# Patient Record
Sex: FEMALE | Race: WHITE | NOT HISPANIC OR LATINO | Employment: FULL TIME | URBAN - METROPOLITAN AREA
[De-identification: names, ages, dates, MRNs, and addresses within clinical notes are randomized per-mention and may not be internally consistent; named-entity substitution may affect disease eponyms.]

---

## 2024-05-01 ENCOUNTER — TELEPHONE (OUTPATIENT)
Dept: GASTROENTEROLOGY | Facility: MEDICAL CENTER | Age: 19
End: 2024-05-01

## 2024-05-01 ENCOUNTER — OFFICE VISIT (OUTPATIENT)
Dept: GASTROENTEROLOGY | Facility: MEDICAL CENTER | Age: 19
End: 2024-05-01
Payer: COMMERCIAL

## 2024-05-01 VITALS
TEMPERATURE: 98.8 F | WEIGHT: 218 LBS | DIASTOLIC BLOOD PRESSURE: 79 MMHG | BODY MASS INDEX: 37.42 KG/M2 | OXYGEN SATURATION: 99 % | HEART RATE: 98 BPM | SYSTOLIC BLOOD PRESSURE: 121 MMHG

## 2024-05-01 DIAGNOSIS — R10.9 ABDOMINAL PAIN: ICD-10-CM

## 2024-05-01 DIAGNOSIS — R19.4 CHANGE IN BOWEL HABITS: Primary | ICD-10-CM

## 2024-05-01 PROCEDURE — 99244 OFF/OP CNSLTJ NEW/EST MOD 40: CPT | Performed by: NURSE PRACTITIONER

## 2024-05-01 RX ORDER — CLINDAMYCIN HYDROCHLORIDE 300 MG/1
300 CAPSULE ORAL 2 TIMES DAILY
COMMUNITY
Start: 2024-04-29 | End: 2024-05-06

## 2024-05-01 RX ORDER — POLYETHYLENE GLYCOL 3350, SODIUM SULFATE ANHYDROUS, SODIUM BICARBONATE, SODIUM CHLORIDE, POTASSIUM CHLORIDE 236; 22.74; 6.74; 5.86; 2.97 G/4L; G/4L; G/4L; G/4L; G/4L
4000 POWDER, FOR SOLUTION ORAL ONCE
Qty: 4000 ML | Refills: 0 | Status: SHIPPED | OUTPATIENT
Start: 2024-05-01 | End: 2024-05-01

## 2024-05-01 NOTE — TELEPHONE ENCOUNTER
Procedure: Colonoscopy   Date: 06/12/2024  Physician performing: Dr. Jaiyeola  Location of procedure:  [facility]   Instructions given to patient: Golytely   Diabetic: N/A  Clearances: N/A    Patient will do follow up in the [facility] after procedure

## 2024-05-01 NOTE — PROGRESS NOTES
[facility] - Outpatient Consultation  Kelly Hernández 18 y.o. female MRN: [id number]  Encounter: [id number]          ASSESSMENT AND PLAN:    Kelly Hernández is a 18 y.o. female who presents with complaint of abdominal pain.    1. Abdominal pain  2.  Change in bowel habits    Started approximately 1 year ago with chronic right lower quadrant pain that can radiate to her right flank.  She reports that this pain is a chronic 4-6/10 and is constant.  It can be worsened with eating.  No relief with a BM.  BMs are 2-3 times per week.  She does feel like she completely evacuates.  Denies any melena or hematochezia.  No help with Tylenol or NSAIDs.  No weight loss, fever or chills.  Denies any nausea, vomiting or dysphagia.  CT 5/23 noted mild enlarged lymph nodes in the right lower quadrant which could be consistent with mesenteric adenitis.  She is followed by GYN.  Workup negative for GYN but considering laparoscopy to rule out endometriosis.  GYN recommending GI evaluation before laparoscopy.  No family history of inflammatory bowel disease.  CBC and CMP normal.  We did have a long discussion of possible causes of her right lower quadrant pain including but not limited to constipation, inflammatory bowel disease, endometriosis, IBS.  Due to her CT being abnormal 5/23 will repeat CT.  Will also set up colonoscopy to rule out any inflammatory bowel disease before patient has a laparoscopy.    -CRP, fecal calprotectin, celiac panel  -Start MiraLAX 1 capful daily  -CT abdomen pelvis  -Colonoscopy with GoLytely/Dulcolax prep  -Follow-up in office after testing    I obtained informed consent from the patient. The risks/benefits/alternatives of the procedure were discussed with the patient. Risks included, but not limited to, infection, bleeding, perforation, injury to organs in the abdomen, missed lesion and incomplete procedure were discussed. Patient was agreeable and electronic signature was  obtained.          ______________________________________________________________________    HPI:    eKlly Hernández is a 18 y.o. female who presents with complaint of abdominal pain.  She has a past medical history of chronic headaches.    Started with chronic right lower quadrant pain approximately 1 year ago.  It is a steady 4-6 out of 10.  Reports that is a cramping and sharp pain that can radiate to her right flank.  No relief with Tylenol or NSAIDs.  Can be worse after eating at times.  Not relieved with a BM.  Stools are 2-3 times per week.  Occasionally has to strain.  Denies any melena hematochezia.  No weight loss.  No skin rashes or frequent eye infections.  No family history of any inflammatory bowel disease.  She did have a CT 5/23 which showed mild prominent mesenteric lymph nodes in the right lower abdomen which can be seen with mild mesenteric adenitis.  Appendix was normal.  There was small amount of free fluid in the pelvis.  She also has been followed by her GYN.  Pain can be worsened during her menstrual cycle.  Reports her cycles last several days and she does have heavy flow.  He did have an ultrasound of the pelvis with transvaginal which noted mild endometrial thickening.  Currently she is discussing a possible laparoscopy to rule out endometriosis with her GYN but recommended GI evaluation first.  Recent CBC and CMP are normal.      Ultrasound right upper quadrant 10/23- normal    CT of pelvis 5/23-mild prominent mesenteric lymph nodes in the right lower abdomen which can be seen with mild mesenteric adenitis.  Normal-appearing appendix.  Small amount of free fluid in the pelvis.    Ultrasound of the pelvis with transvaginal 5/23-mild endometrial thickening.  Ovaries are normal.  The uterus is normal in size and contour.    Labs 4/24-CMP normal other than total bili 1.02, CBC normal.      REVIEW OF SYSTEMS:    CONSTITUTIONAL: Denies any fever, chills, rigors, and weight loss.  HEENT: No  earache or tinnitus. Denies hearing loss or visual disturbances.  CARDIOVASCULAR: No chest pain or palpitations.   RESPIRATORY: Denies any cough, hemoptysis, shortness of breath or dyspnea on exertion.  GASTROINTESTINAL: As noted in the History of Present Illness.   GENITOURINARY: No problems with urination. Denies any hematuria or dysuria.  NEUROLOGIC: No dizziness or vertigo, denies headaches.   MUSCULOSKELETAL: Denies any muscle or joint pain.   SKIN: Denies skin rashes or itching.   ENDOCRINE: Denies excessive thirst. Denies intolerance to heat or cold.  PSYCHOSOCIAL: Denies depression or anxiety. Denies any recent memory loss.       Historical Information   Past Medical History:   Diagnosis Date   • Concussion 11/2020     History reviewed. No pertinent surgical history.  Social History   Social History     Substance and Sexual Activity   Alcohol Use Never     Social History     Substance and Sexual Activity   Drug Use Never     Social History     Tobacco Use   Smoking Status Never   Smokeless Tobacco Never     Family History   Adopted: Yes       Meds/Allergies       Current Outpatient Medications:   •  clindamycin (CLEOCIN) 300 MG capsule  •  norethindrone-ethinyl estradiol-iron (MICROGESTIN FE1.5/30) 1.5-30 MG-MCG tablet  •  PARoxetine (PAXIL) 40 MG tablet  •  polyethylene glycol (Golytely) 4000 mL solution    Allergies   Allergen Reactions   • Cephalexin Vomiting     Severe vomiting           Objective     Blood pressure 121/79, pulse 98, temperature 98.8 °F (37.1 °C), temperature source Tympanic, weight 98.9 kg (218 lb), last menstrual period 03/21/2024, SpO2 99%. Body mass index is 37.42 kg/m².        PHYSICAL EXAM:      General Appearance:   Alert, cooperative, no distress   HEENT:   Normocephalic, atraumatic, anicteric.     Neck:  Supple, symmetrical, trachea midline   Lungs:   Clear to auscultation bilaterally; no rales, rhonchi or wheezing; respirations unlabored    Heart::   Regular rate and rhythm; no  murmur, rub, or gallop.   Abdomen:   Soft, tender right lower quadrant with light palpation, no rebound tenderness, non-distended; normal bowel sounds; no masses, no organomegaly    Genitalia:   Deferred    Rectal:   Deferred    Extremities:  No cyanosis, clubbing or edema    Pulses:  2+ and symmetric    Skin:  No jaundice, rashes, or lesions    Lymph nodes:  No palpable cervical lymphadenopathy        Lab Results:   No visits with results within 1 Day(s) from this visit.   Latest known visit with results is:   Admission on 04/06/2024, Discharged on 04/06/2024   Component Date Value   • WBC 04/06/2024 6.98    • RBC 04/06/2024 4.65    • Hemoglobin 04/06/2024 13.4    • Hematocrit 04/06/2024 40.6    • MCV 04/06/2024 87    • MCH 04/06/2024 28.8    • MCHC 04/06/2024 33.0    • RDW 04/06/2024 12.1    • MPV 04/06/2024 9.1    • Platelets 04/06/2024 265    • nRBC 04/06/2024 0    • Segmented % 04/06/2024 51    • Immature Grans % 04/06/2024 0    • Lymphocytes % 04/06/2024 32    • Monocytes % 04/06/2024 13 (H)    • Eosinophils Relative 04/06/2024 3    • Basophils Relative 04/06/2024 1    • Absolute Neutrophils 04/06/2024 3.56    • Absolute Immature Grans 04/06/2024 0.02    • Absolute Lymphocytes 04/06/2024 2.26    • Absolute Monocytes 04/06/2024 0.89    • Eosinophils Absolute 04/06/2024 0.20    • Basophils Absolute 04/06/2024 0.05    • Sodium 04/06/2024 138    • Potassium 04/06/2024 3.5    • Chloride 04/06/2024 104    • CO2 04/06/2024 28    • ANION GAP 04/06/2024 6    • BUN 04/06/2024 11    • Creatinine 04/06/2024 0.66    • Glucose 04/06/2024 77    • Calcium 04/06/2024 9.4    • AST 04/06/2024 18    • ALT 04/06/2024 16    • Alkaline Phosphatase 04/06/2024 68    • Total Protein 04/06/2024 6.6    • Albumin 04/06/2024 4.2    • Total Bilirubin 04/06/2024 1.02 (H)    • eGFR 04/06/2024 128    • LACTIC ACID 04/06/2024 1.2    • Color, UA 04/06/2024 Yellow    • Clarity, UA 04/06/2024 Cloudy    • Specific Gravity, UA 04/06/2024 1.015    •  "pH, UA 04/06/2024 8.0    • Leukocytes, UA 04/06/2024 Small (A)    • Nitrite, UA 04/06/2024 Negative    • Protein, UA 04/06/2024 Negative    • Glucose, UA 04/06/2024 Negative    • Ketones, UA 04/06/2024 Negative    • Urobilinogen, UA 04/06/2024 4.0 (A)    • Bilirubin, UA 04/06/2024 Negative    • Occult Blood, UA 04/06/2024 Negative    • EXT Preg Test, Ur 04/06/2024 Negative    • Control 04/06/2024 Valid    • hs TnI 0hr 04/06/2024 <2    • TSH 3RD GENERATON 04/06/2024 1.082    • RBC, UA 04/06/2024 0-1    • WBC, UA 04/06/2024 1-2    • Epithelial Cells 04/06/2024 Occasional    • Bacteria, UA 04/06/2024 Occasional    • Ventricular Rate 04/06/2024 83    • Atrial Rate 04/06/2024 83    • DC Interval 04/06/2024 136    • QRSD Interval 04/06/2024 86    • QT Interval 04/06/2024 346    • QTC Interval 04/06/2024 406    • P Axis 04/06/2024 51    • QRS Axis 04/06/2024 -14    • T Wave Axis 04/06/2024 15        Lab Results   Component Value Date    WBC 6.98 04/06/2024    HGB 13.4 04/06/2024    HCT 40.6 04/06/2024    MCV 87 04/06/2024     04/06/2024       Lab Results   Component Value Date    SODIUM 138 04/06/2024    K 3.5 04/06/2024     04/06/2024    CO2 28 04/06/2024    AGAP 6 04/06/2024    BUN 11 04/06/2024    CREATININE 0.66 04/06/2024    GLUC 77 04/06/2024    CALCIUM 9.4 04/06/2024    AST 18 04/06/2024    ALT 16 04/06/2024    ALKPHOS 68 04/06/2024    TP 6.6 04/06/2024    TBILI 1.02 (H) 04/06/2024    EGFR 128 04/06/2024       No results found for: \"CRP\"    Lab Results   Component Value Date    WXZ3DMMSAMVJ 1.082 04/06/2024    TSH 0.62 08/17/2022       No results found for: \"IRON\", \"TIBC\", \"FERRITIN\"    Radiology Results:   No results found.  "

## 2024-05-09 ENCOUNTER — HOSPITAL ENCOUNTER (OUTPATIENT)
Dept: CT IMAGING | Facility: HOSPITAL | Age: 19
Discharge: HOME | End: 2024-05-09
Payer: COMMERCIAL

## 2024-05-09 DIAGNOSIS — R10.9 ABDOMINAL PAIN: ICD-10-CM

## 2024-05-09 PROCEDURE — 74177 CT ABD & PELVIS W/CONTRAST: CPT

## 2024-05-09 RX ADMIN — IOHEXOL 100 ML: 350 INJECTION, SOLUTION INTRAVENOUS at 17:10

## 2024-06-11 RX ORDER — SODIUM CHLORIDE, SODIUM LACTATE, POTASSIUM CHLORIDE, CALCIUM CHLORIDE 600; 310; 30; 20 MG/100ML; MG/100ML; MG/100ML; MG/100ML
20 INJECTION, SOLUTION INTRAVENOUS CONTINUOUS
OUTPATIENT
Start: 2024-06-11

## 2024-06-11 RX ORDER — SODIUM CHLORIDE, SODIUM LACTATE, POTASSIUM CHLORIDE, CALCIUM CHLORIDE 600; 310; 30; 20 MG/100ML; MG/100ML; MG/100ML; MG/100ML
125 INJECTION, SOLUTION INTRAVENOUS CONTINUOUS
OUTPATIENT
Start: 2024-06-11

## 2024-06-13 ENCOUNTER — TELEPHONE (OUTPATIENT)
Dept: GASTROENTEROLOGY | Facility: CLINIC | Age: 19
End: 2024-06-13

## 2024-06-13 NOTE — TELEPHONE ENCOUNTER
I called the patient to reschedule her Colonoscopy, she agreed to reschedule. She has all the instructions and the prep.

## 2024-07-16 ENCOUNTER — ANESTHESIA (OUTPATIENT)
Dept: ANESTHESIOLOGY | Facility: HOSPITAL | Age: 19
End: 2024-07-16

## 2024-07-16 ENCOUNTER — ANESTHESIA EVENT (OUTPATIENT)
Dept: ANESTHESIOLOGY | Facility: HOSPITAL | Age: 19
End: 2024-07-16

## 2024-07-16 NOTE — ANESTHESIA PREPROCEDURE EVALUATION
Procedure:  PRE-OP ONLY    Bowel habits    ECG: NSR with septal infarct       Relevant Problems   NEURO/PSYCH   (+) Other headache syndrome             Anesthesia Plan  ASA Score- 2     Anesthesia Type- IV sedation with anesthesia with ASA Monitors.         Additional Monitors:     Airway Plan:            Plan Factors-    Chart reviewed. EKG reviewed.  Existing labs reviewed. Patient summary reviewed.                  Induction-     Postoperative Plan-         Informed Consent-

## 2024-07-17 ENCOUNTER — TELEPHONE (OUTPATIENT)
Age: 19
End: 2024-07-17

## 2024-07-17 NOTE — TELEPHONE ENCOUNTER
Spoke with Pharmacy. Rx on hold as pt previously declined it. They will fill. There is a $3 reprocessing fee. Pt made aware. No further concerns.

## 2024-07-17 NOTE — TELEPHONE ENCOUNTER
Patients GI provider:  Dr. Olivares    Number to return call: [phone]    Reason for call: Pt calling to advise Katiuska napoles at pharmacy. Transferred to [facility] in triage for further assistance.    Scheduled procedure/appointment date if applicable: procedure 07/19/2024

## 2024-07-19 ENCOUNTER — ANESTHESIA (OUTPATIENT)
Dept: PERIOP | Facility: HOSPITAL | Age: 19
End: 2024-07-19

## 2024-07-19 ENCOUNTER — ANESTHESIA EVENT (OUTPATIENT)
Dept: PERIOP | Facility: HOSPITAL | Age: 19
End: 2024-07-19

## 2024-07-19 ENCOUNTER — HOSPITAL ENCOUNTER (OUTPATIENT)
Dept: PERIOP | Facility: HOSPITAL | Age: 19
Setting detail: OUTPATIENT SURGERY
End: 2024-07-19
Payer: COMMERCIAL

## 2024-07-19 VITALS
TEMPERATURE: 97.6 F | DIASTOLIC BLOOD PRESSURE: 82 MMHG | HEIGHT: 64 IN | BODY MASS INDEX: 37.22 KG/M2 | RESPIRATION RATE: 18 BRPM | WEIGHT: 218 LBS | HEART RATE: 70 BPM | OXYGEN SATURATION: 100 % | SYSTOLIC BLOOD PRESSURE: 130 MMHG

## 2024-07-19 DIAGNOSIS — R19.4 CHANGE IN BOWEL HABITS: ICD-10-CM

## 2024-07-19 DIAGNOSIS — R10.9 ABDOMINAL PAIN: ICD-10-CM

## 2024-07-19 LAB
EXT PREGNANCY TEST URINE: NEGATIVE
EXT. CONTROL: NORMAL

## 2024-07-19 PROCEDURE — 45380 COLONOSCOPY AND BIOPSY: CPT | Performed by: STUDENT IN AN ORGANIZED HEALTH CARE EDUCATION/TRAINING PROGRAM

## 2024-07-19 PROCEDURE — 88305 TISSUE EXAM BY PATHOLOGIST: CPT | Performed by: SPECIALIST

## 2024-07-19 PROCEDURE — 81025 URINE PREGNANCY TEST: CPT | Performed by: ANESTHESIOLOGY

## 2024-07-19 RX ORDER — SODIUM CHLORIDE, SODIUM LACTATE, POTASSIUM CHLORIDE, CALCIUM CHLORIDE 600; 310; 30; 20 MG/100ML; MG/100ML; MG/100ML; MG/100ML
100 INJECTION, SOLUTION INTRAVENOUS CONTINUOUS
Status: DISCONTINUED | OUTPATIENT
Start: 2024-07-19 | End: 2024-07-23 | Stop reason: HOSPADM

## 2024-07-19 RX ORDER — LIDOCAINE HYDROCHLORIDE 10 MG/ML
INJECTION, SOLUTION EPIDURAL; INFILTRATION; INTRACAUDAL; PERINEURAL AS NEEDED
Status: DISCONTINUED | OUTPATIENT
Start: 2024-07-19 | End: 2024-07-19

## 2024-07-19 RX ORDER — SODIUM CHLORIDE, SODIUM LACTATE, POTASSIUM CHLORIDE, CALCIUM CHLORIDE 600; 310; 30; 20 MG/100ML; MG/100ML; MG/100ML; MG/100ML
125 INJECTION, SOLUTION INTRAVENOUS CONTINUOUS
Status: DISCONTINUED | OUTPATIENT
Start: 2024-07-19 | End: 2024-07-23 | Stop reason: HOSPADM

## 2024-07-19 RX ORDER — SODIUM CHLORIDE, SODIUM LACTATE, POTASSIUM CHLORIDE, CALCIUM CHLORIDE 600; 310; 30; 20 MG/100ML; MG/100ML; MG/100ML; MG/100ML
20 INJECTION, SOLUTION INTRAVENOUS CONTINUOUS
Status: DISCONTINUED | OUTPATIENT
Start: 2024-07-19 | End: 2024-07-23 | Stop reason: HOSPADM

## 2024-07-19 RX ORDER — SODIUM CHLORIDE, SODIUM LACTATE, POTASSIUM CHLORIDE, CALCIUM CHLORIDE 600; 310; 30; 20 MG/100ML; MG/100ML; MG/100ML; MG/100ML
INJECTION, SOLUTION INTRAVENOUS CONTINUOUS PRN
Status: DISCONTINUED | OUTPATIENT
Start: 2024-07-19 | End: 2024-07-19

## 2024-07-19 RX ORDER — PROPOFOL 10 MG/ML
INJECTION, EMULSION INTRAVENOUS AS NEEDED
Status: DISCONTINUED | OUTPATIENT
Start: 2024-07-19 | End: 2024-07-19

## 2024-07-19 RX ADMIN — PROPOFOL 50 MG: 10 INJECTION, EMULSION INTRAVENOUS at 11:26

## 2024-07-19 RX ADMIN — PROPOFOL 200 MG: 10 INJECTION, EMULSION INTRAVENOUS at 11:17

## 2024-07-19 RX ADMIN — PROPOFOL 20 MG: 10 INJECTION, EMULSION INTRAVENOUS at 11:20

## 2024-07-19 RX ADMIN — PROPOFOL 130 MCG/KG/MIN: 10 INJECTION, EMULSION INTRAVENOUS at 11:18

## 2024-07-19 RX ADMIN — PROPOFOL 20 MG: 10 INJECTION, EMULSION INTRAVENOUS at 11:24

## 2024-07-19 RX ADMIN — LIDOCAINE HYDROCHLORIDE 50 MG: 10 INJECTION, SOLUTION EPIDURAL; INFILTRATION; INTRACAUDAL; PERINEURAL at 11:17

## 2024-07-19 RX ADMIN — SODIUM CHLORIDE, SODIUM LACTATE, POTASSIUM CHLORIDE, AND CALCIUM CHLORIDE 125 ML/HR: .6; .31; .03; .02 INJECTION, SOLUTION INTRAVENOUS at 10:20

## 2024-07-19 RX ADMIN — SODIUM CHLORIDE, SODIUM LACTATE, POTASSIUM CHLORIDE, AND CALCIUM CHLORIDE: .6; .31; .03; .02 INJECTION, SOLUTION INTRAVENOUS at 11:11

## 2024-07-19 RX ADMIN — PROPOFOL 20 MG: 10 INJECTION, EMULSION INTRAVENOUS at 11:25

## 2024-07-19 RX ADMIN — PROPOFOL 20 MG: 10 INJECTION, EMULSION INTRAVENOUS at 11:23

## 2024-07-19 NOTE — ANESTHESIA PREPROCEDURE EVALUATION
Procedure:  COLONOSCOPY  Bowel habits     ECG: NSR with septal infarct     Denies the following: CP/SOB with exertion, asthma, COPD, ERNESTO, stroke/TIA, seizure    Relevant Problems   NEURO/PSYCH   (+) Other headache syndrome        Physical Exam    Airway    Mallampati score: I  TM Distance: >3 FB  Neck ROM: full     Dental   No notable dental hx     Cardiovascular      Pulmonary      Other Findings        Anesthesia Plan  ASA Score- 2     Anesthesia Type- IV sedation with anesthesia with ASA Monitors.         Additional Monitors:     Airway Plan:            Plan Factors-Exercise tolerance (METS): >4 METS.    Chart reviewed. EKG reviewed.  Existing labs reviewed. Patient summary reviewed.    Patient is not a current smoker.      Obstructive sleep apnea risk education given perioperatively.        Induction-     Postoperative Plan-         Informed Consent- Anesthetic plan and risks discussed with patient.  I personally reviewed this patient with the CRNA. Discussed and agreed on the Anesthesia Plan with the CRNA..

## 2024-07-19 NOTE — ANESTHESIA POSTPROCEDURE EVALUATION
Post-Op Assessment Note    CV Status:  Stable  Pain Score: 0    Pain management: adequate       Mental Status:  Sleepy and arousable   Hydration Status:  Euvolemic   PONV Controlled:  Controlled   Airway Patency:  Patent     Post Op Vitals Reviewed: Yes    No anethesia notable event occurred.    Staff: Anesthesiologist, with CRNAs               BP   114/63   Temp NA   Pulse 86   Resp 14   SpO2 98

## 2024-07-19 NOTE — H&P
"  [facility]  History & Physical     PATIENT INFO     Name: Kelly Hernández  YOB: 2005   Age: 18 y.o.   Sex: female   MRN: [id number]     HISTORY OF PRESENT ILLNESS     Kelly Hernández is a 18 y.o. year old female who presents for colonoscopy for abdominal pain, change in bowel habits. No prior colonoscopy. No antithrombotics or anticoagulants.     REVIEW OF SYSTEMS     Per the HPI, and otherwise unremarkable.    Historical Information   Past Medical History:   Diagnosis Date    Concussion 11/2020     History reviewed. No pertinent surgical history.  Social History   Social History     Substance and Sexual Activity   Alcohol Use Never     Social History     Substance and Sexual Activity   Drug Use Never     Social History     Tobacco Use   Smoking Status Never   Smokeless Tobacco Never     Family History   Adopted: Yes        MEDICATIONS & ALLERGIES     Current Outpatient Medications   Medication Instructions    norethindrone-ethinyl estradiol-iron (MICROGESTIN FE1.5/30) 1.5-30 MG-MCG tablet 1 tablet, Oral, Daily    PARoxetine (PAXIL) 40 mg, Oral, Daily    polyethylene glycol (Golytely) 4000 mL solution 4,000 mL, Oral, Once, Take 4000 mL by mouth once for 1 dose. Use as directed     Allergies   Allergen Reactions    Cephalexin Vomiting     Severe vomiting        PHYSICAL EXAM      Objective   Blood pressure 147/75, pulse 83, temperature (!) 97 °F (36.1 °C), temperature source Tympanic, resp. rate 20, height 5' 4\" (1.626 m), weight 98.9 kg (218 lb), last menstrual period 07/08/2024, SpO2 99%. Body mass index is 37.42 kg/m².    General Appearance:   Alert, cooperative, no distress   Lungs:   Equal chest rise, respirations unlabored    Heart:   Regular rate and rhythm   Abdomen:   Soft, non-tender, non-distended; normal bowel sounds; no masses, no organomegaly    Extremities:   No edema       ASSESSMENT & PLAN     This is a 18 y.o. year old female here for colonoscopy, and " she is stable and optimized for her procedure.      Reji Olivares D.O.  [facility]  Division of Gastroenterology & Hepatology  Available on [employer]  [email or web address]    ** Please Note: This note is constructed using a voice recognition dictation system. **

## 2024-07-23 PROCEDURE — 88305 TISSUE EXAM BY PATHOLOGIST: CPT | Performed by: SPECIALIST

## 2024-07-24 NOTE — RESULT ENCOUNTER NOTE
Maxx Mendoza,  I am happy to report that all of the biopsy results from your colonoscopy done on 7/19 have returned and they appear normal. This is good news. You should call the office and set up follow up visit to discuss management of symptoms. Please reach out to me if you have any further questions or concerns.    Tarun Smith MD  Fellow,   Department of Gastroenterology,  [facility].    Sent MCM as above

## 2024-07-25 DIAGNOSIS — Z00.6 ENCOUNTER FOR EXAMINATION FOR NORMAL COMPARISON OR CONTROL IN CLINICAL RESEARCH PROGRAM: ICD-10-CM

## 2024-07-29 ENCOUNTER — APPOINTMENT (OUTPATIENT)
Dept: LAB | Facility: CLINIC | Age: 19
End: 2024-07-29

## 2024-07-29 DIAGNOSIS — Z00.6 ENCOUNTER FOR EXAMINATION FOR NORMAL COMPARISON OR CONTROL IN CLINICAL RESEARCH PROGRAM: ICD-10-CM

## 2024-07-29 PROCEDURE — 36415 COLL VENOUS BLD VENIPUNCTURE: CPT

## 2024-08-06 LAB
APOB+LDLR+PCSK9 GENE MUT ANL BLD/T: NOT DETECTED
BRCA1+BRCA2 DEL+DUP + FULL MUT ANL BLD/T: NOT DETECTED
MLH1+MSH2+MSH6+PMS2 GN DEL+DUP+FUL M: NOT DETECTED

## 2025-04-29 ENCOUNTER — APPOINTMENT (EMERGENCY)
Dept: CT IMAGING | Facility: HOSPITAL | Age: 20
End: 2025-04-29

## 2025-04-29 ENCOUNTER — HOSPITAL ENCOUNTER (EMERGENCY)
Facility: HOSPITAL | Age: 20
Discharge: HOME | End: 2025-04-29
Attending: EMERGENCY MEDICINE

## 2025-04-29 VITALS
HEIGHT: 64 IN | WEIGHT: 225 LBS | OXYGEN SATURATION: 96 % | DIASTOLIC BLOOD PRESSURE: 58 MMHG | BODY MASS INDEX: 38.41 KG/M2 | RESPIRATION RATE: 18 BRPM | SYSTOLIC BLOOD PRESSURE: 117 MMHG | HEART RATE: 77 BPM | TEMPERATURE: 98.3 F

## 2025-04-29 DIAGNOSIS — R10.9 ABDOMINAL PAIN: Primary | ICD-10-CM

## 2025-04-29 LAB
ALBUMIN SERPL BCG-MCNC: 4.1 G/DL (ref 3.5–5)
ALP SERPL-CCNC: 59 U/L (ref 34–104)
ALT SERPL W P-5'-P-CCNC: 13 U/L (ref 7–52)
ANION GAP SERPL CALCULATED.3IONS-SCNC: 7 MMOL/L (ref 4–13)
AST SERPL W P-5'-P-CCNC: 16 U/L (ref 13–39)
BASOPHILS # BLD AUTO: 0.03 THOUSANDS/ÂΜL (ref 0–0.1)
BASOPHILS NFR BLD AUTO: 0 % (ref 0–1)
BILIRUB SERPL-MCNC: 0.86 MG/DL (ref 0.2–1)
BILIRUB UR QL STRIP: NEGATIVE
BUN SERPL-MCNC: 6 MG/DL (ref 5–25)
CALCIUM SERPL-MCNC: 8.8 MG/DL (ref 8.4–10.2)
CHLORIDE SERPL-SCNC: 105 MMOL/L (ref 96–108)
CLARITY UR: CLEAR
CO2 SERPL-SCNC: 24 MMOL/L (ref 21–32)
COLOR UR: YELLOW
CREAT SERPL-MCNC: 0.66 MG/DL (ref 0.6–1.3)
EOSINOPHIL # BLD AUTO: 0.12 THOUSAND/ÂΜL (ref 0–0.61)
EOSINOPHIL NFR BLD AUTO: 2 % (ref 0–6)
ERYTHROCYTE [DISTWIDTH] IN BLOOD BY AUTOMATED COUNT: 12.2 % (ref 11.6–15.1)
EXT PREGNANCY TEST URINE: NEGATIVE
EXT. CONTROL: NORMAL
GFR SERPL CREATININE-BSD FRML MDRD: 127 ML/MIN/1.73SQ M
GLUCOSE SERPL-MCNC: 71 MG/DL (ref 65–140)
GLUCOSE UR STRIP-MCNC: NEGATIVE MG/DL
HCT VFR BLD AUTO: 39.7 % (ref 34.8–46.1)
HGB BLD-MCNC: 12.9 G/DL (ref 11.5–15.4)
HGB UR QL STRIP.AUTO: NEGATIVE
IMM GRANULOCYTES # BLD AUTO: 0.04 THOUSAND/UL (ref 0–0.2)
IMM GRANULOCYTES NFR BLD AUTO: 1 % (ref 0–2)
KETONES UR STRIP-MCNC: NEGATIVE MG/DL
LACTATE SERPL-SCNC: 1.4 MMOL/L (ref 0.5–2)
LEUKOCYTE ESTERASE UR QL STRIP: NEGATIVE
LIPASE SERPL-CCNC: 15 U/L (ref 11–82)
LYMPHOCYTES # BLD AUTO: 2.13 THOUSANDS/ÂΜL (ref 0.6–4.47)
LYMPHOCYTES NFR BLD AUTO: 29 % (ref 14–44)
MCH RBC QN AUTO: 27.7 PG (ref 26.8–34.3)
MCHC RBC AUTO-ENTMCNC: 32.5 G/DL (ref 31.4–37.4)
MCV RBC AUTO: 85 FL (ref 82–98)
MONOCYTES # BLD AUTO: 0.84 THOUSAND/ÂΜL (ref 0.17–1.22)
MONOCYTES NFR BLD AUTO: 12 % (ref 4–12)
NEUTROPHILS # BLD AUTO: 4.12 THOUSANDS/ÂΜL (ref 1.85–7.62)
NEUTS SEG NFR BLD AUTO: 56 % (ref 43–75)
NITRITE UR QL STRIP: NEGATIVE
NRBC BLD AUTO-RTO: 0 /100 WBCS
PH UR STRIP.AUTO: 6 [PH]
PLATELET # BLD AUTO: 296 THOUSANDS/UL (ref 149–390)
PMV BLD AUTO: 8.9 FL (ref 8.9–12.7)
POTASSIUM SERPL-SCNC: 3.4 MMOL/L (ref 3.5–5.3)
PROT SERPL-MCNC: 6.7 G/DL (ref 6.4–8.4)
PROT UR STRIP-MCNC: NEGATIVE MG/DL
RBC # BLD AUTO: 4.66 MILLION/UL (ref 3.81–5.12)
SODIUM SERPL-SCNC: 136 MMOL/L (ref 135–147)
SP GR UR STRIP.AUTO: 1.02 (ref 1–1.03)
UROBILINOGEN UR QL STRIP.AUTO: 0.2 E.U./DL
WBC # BLD AUTO: 7.28 THOUSAND/UL (ref 4.31–10.16)

## 2025-04-29 PROCEDURE — 85025 COMPLETE CBC W/AUTO DIFF WBC: CPT | Performed by: PHYSICIAN ASSISTANT

## 2025-04-29 PROCEDURE — 99284 EMERGENCY DEPT VISIT MOD MDM: CPT | Performed by: PHYSICIAN ASSISTANT

## 2025-04-29 PROCEDURE — 36415 COLL VENOUS BLD VENIPUNCTURE: CPT | Performed by: PHYSICIAN ASSISTANT

## 2025-04-29 PROCEDURE — 81003 URINALYSIS AUTO W/O SCOPE: CPT | Performed by: PHYSICIAN ASSISTANT

## 2025-04-29 PROCEDURE — 96365 THER/PROPH/DIAG IV INF INIT: CPT

## 2025-04-29 PROCEDURE — 74177 CT ABD & PELVIS W/CONTRAST: CPT

## 2025-04-29 PROCEDURE — 96375 TX/PRO/DX INJ NEW DRUG ADDON: CPT

## 2025-04-29 PROCEDURE — 81025 URINE PREGNANCY TEST: CPT | Performed by: PHYSICIAN ASSISTANT

## 2025-04-29 PROCEDURE — 83605 ASSAY OF LACTIC ACID: CPT | Performed by: PHYSICIAN ASSISTANT

## 2025-04-29 PROCEDURE — 99284 EMERGENCY DEPT VISIT MOD MDM: CPT

## 2025-04-29 PROCEDURE — 80053 COMPREHEN METABOLIC PANEL: CPT | Performed by: PHYSICIAN ASSISTANT

## 2025-04-29 PROCEDURE — 83690 ASSAY OF LIPASE: CPT | Performed by: PHYSICIAN ASSISTANT

## 2025-04-29 RX ORDER — ACETAMINOPHEN 10 MG/ML
1000 INJECTION, SOLUTION INTRAVENOUS ONCE
Status: COMPLETED | OUTPATIENT
Start: 2025-04-29 | End: 2025-04-29

## 2025-04-29 RX ORDER — ONDANSETRON 2 MG/ML
4 INJECTION INTRAMUSCULAR; INTRAVENOUS ONCE
Status: COMPLETED | OUTPATIENT
Start: 2025-04-29 | End: 2025-04-29

## 2025-04-29 RX ORDER — ONDANSETRON 4 MG/1
4 TABLET, FILM COATED ORAL EVERY 6 HOURS
Qty: 12 TABLET | Refills: 0 | Status: SHIPPED | OUTPATIENT
Start: 2025-04-29

## 2025-04-29 RX ORDER — DICYCLOMINE HCL 20 MG
20 TABLET ORAL 2 TIMES DAILY
Qty: 20 TABLET | Refills: 0 | Status: SHIPPED | OUTPATIENT
Start: 2025-04-29

## 2025-04-29 RX ADMIN — IOHEXOL 75 ML: 350 INJECTION, SOLUTION INTRAVENOUS at 13:34

## 2025-04-29 RX ADMIN — ONDANSETRON 4 MG: 2 INJECTION INTRAMUSCULAR; INTRAVENOUS at 12:42

## 2025-04-29 RX ADMIN — ACETAMINOPHEN 1000 MG: 1000 INJECTION, SOLUTION INTRAVENOUS at 12:43

## 2025-04-29 NOTE — ED PROVIDER NOTES
Time reflects when diagnosis was documented in both MDM as applicable and the Disposition within this note       Time User Action Codes Description Comment    4/29/2025  2:16 PM Ирина Stewart Add [R10.9] Abdominal pain           ED Disposition       ED Disposition   Discharge    Condition   Stable    Date/Time   Tue Apr 29, 2025  2:16 PM    Comment   Kelly Chance Yesenoskjoel discharge to home/self care.                   Assessment & Plan       Medical Decision Making  19 year old female presents to the ED for evaluation of abdominal pain. Vitals and medical record reviewed. Abdominal exam without peritoneal signs.  No evidence of acute abdomen at this time.  Well-appearing.  Given work-up, low suspicion for acute hepatobiliary disease (including acute cholecystitis or cholangitis), acute pancreatitis (negative lipase), PUD (including gastric perforation), acute infectious processes (pneumonia, hepatitis, pyelonephritis), acute appendicitis, vascular catastrophe, bowel obstruction, viscus perforation, ovarian torsion, or diverticulitis.  Presentation not consistent with other acute emergent causes of abdominal pain at this time.Patient was educated on symptoms that require prompt return to the emergency department for further evaluation and verbalized understanding.  Patient was clinically and hemodynamically stable for discharge.         Problems Addressed:  Abdominal pain: acute illness or injury    Amount and/or Complexity of Data Reviewed  Labs: ordered. Decision-making details documented in ED Course.  Radiology: ordered.    Risk  Prescription drug management.        ED Course as of 04/29/25 1426   Tue Apr 29, 2025   1246 PREGNANCY TEST URINE: Negative   1311 WBC: 7.28   1311 UA (URINE) with reflex to Scope  UA negative for UTI   1328 Patient reevaluated.  Resting comfortably.  Updated on all results and findings   1416 CT:   No acute findings in the abdomen or pelvis.        1421 Discussed all results and  findings with the patient including symptomatic treatment at home return precautions and follow-up and patient verbalized understanding.  She was clinically hemodynamically stable for discharge       Medications   acetaminophen (Ofirmev) injection 1,000 mg (0 mg Intravenous Stopped 4/29/25 1347)   ondansetron (ZOFRAN) injection 4 mg (4 mg Intravenous Given 4/29/25 1242)   iohexol (OMNIPAQUE) 350 MG/ML injection (MULTI-DOSE) 75 mL (75 mL Intravenous Given 4/29/25 1334)       ED Risk Strat Scores                    No data recorded                            History of Present Illness       Chief Complaint   Patient presents with    Abdominal Pain     Pt presented to this ED from home c/o intermittent lower right abdominal pain w/nausea and constipation over past week. Hx ovarian cyst Denies travel/sob/fevers/cough/vomiting       Past Medical History:   Diagnosis Date    Concussion 11/2020      History reviewed. No pertinent surgical history.   Family History   Adopted: Yes      Social History     Tobacco Use    Smoking status: Never    Smokeless tobacco: Never   Vaping Use    Vaping status: Every Day    Substances: Nicotine, THC, Flavoring   Substance Use Topics    Alcohol use: Never    Drug use: Never      E-Cigarette/Vaping    E-Cigarette Use Current Every Day User       E-Cigarette/Vaping Substances    Nicotine Yes     THC Yes     CBD No     Flavoring Yes       I have reviewed and agree with the history as documented.     19-year-old female presents to the emergency department for evaluation of right-sided abdominal pain.  Patient states this has been intermittent for the last week.  She reports pain is mainly in the right lower quadrant.  Reports constipation with intermittent nausea.  Denies vomiting.  Denies dysuria hematuria urinary frequency.  Last menstrual cycle was last month, unsure of pregnancy status.  She reports past medical history of ovarian cyst.  Has never had abdominal surgery.  She denies any  fevers, chills, cough, congestion, shortness of breath.  She denies any flank or back pain. She denies vaginal pain, abnormal vaginal discharge or vaginal itch.        Review of Systems   Constitutional:  Negative for appetite change, fatigue and fever.   HENT: Negative.     Respiratory: Negative.     Cardiovascular: Negative.    Gastrointestinal:  Positive for abdominal pain, constipation and nausea. Negative for diarrhea and vomiting.   Genitourinary: Negative.    Musculoskeletal: Negative.    Skin: Negative.    Neurological: Negative.    All other systems reviewed and are negative.          Objective       ED Triage Vitals [04/29/25 1231]   Temperature Pulse Blood Pressure Respirations SpO2 Patient Position - Orthostatic VS   98.3 °F (36.8 °C) 76 137/83 18 98 % Lying      Temp src Heart Rate Source BP Location FiO2 (%) Pain Score    -- Monitor Right arm -- 7      Vitals      Date and Time Temp Pulse SpO2 Resp BP Pain Score FACES Pain Rating User   04/29/25 1415 -- 77 96 % -- -- -- --    04/29/25 1400 -- 76 90 % -- -- -- --    04/29/25 1345 -- 78 98 % -- -- -- --    04/29/25 1330 -- 79 100 % -- -- -- --    04/29/25 1315 -- 87 100 % -- 117/58 -- --    04/29/25 1300 -- 83 100 % -- 119/71 -- --    04/29/25 1245 -- 88 100 % -- 130/67 -- --    04/29/25 1231 98.3 °F (36.8 °C) 76 98 % 18 137/83 7 -- AC            Physical Exam  Vitals and nursing note reviewed.   Constitutional:       General: She is not in acute distress.     Appearance: She is well-developed. She is not ill-appearing, toxic-appearing or diaphoretic.   HENT:      Head: Normocephalic.      Mouth/Throat:      Mouth: Mucous membranes are moist.   Eyes:      Extraocular Movements: Extraocular movements intact.      Pupils: Pupils are equal, round, and reactive to light.   Cardiovascular:      Rate and Rhythm: Normal rate and regular rhythm.   Pulmonary:      Effort: Pulmonary effort is normal.      Breath sounds: Normal breath sounds. No  stridor. No wheezing, rhonchi or rales.   Chest:      Chest wall: No tenderness.   Abdominal:      General: Abdomen is flat.      Palpations: Abdomen is soft.      Tenderness: There is abdominal tenderness in the right upper quadrant, right lower quadrant and suprapubic area. There is no right CVA tenderness or left CVA tenderness.   Skin:     General: Skin is warm and dry.      Coloration: Skin is not mottled.      Findings: No erythema or rash.   Neurological:      General: No focal deficit present.      Mental Status: She is alert and oriented to person, place, and time.   Psychiatric:         Mood and Affect: Mood normal.         Results Reviewed       Procedure Component Value Units Date/Time    Comprehensive metabolic panel [[id number]]  (Abnormal) Collected: 04/29/25 1240    Lab Status: Final result Specimen: Blood from Arm, Right Updated: 04/29/25 1318     Sodium 136 mmol/L      Potassium 3.4 mmol/L      Chloride 105 mmol/L      CO2 24 mmol/L      ANION GAP 7 mmol/L      BUN 6 mg/dL      Creatinine 0.66 mg/dL      Glucose 71 mg/dL      Calcium 8.8 mg/dL      AST 16 U/L      ALT 13 U/L      Alkaline Phosphatase 59 U/L      Total Protein 6.7 g/dL      Albumin 4.1 g/dL      Total Bilirubin 0.86 mg/dL      eGFR 127 ml/min/1.73sq m     Narrative:      [employer] guidelines for Chronic Kidney Disease (CKD):     Stage 1 with normal or high GFR (GFR > 90 mL/min/1.73 square meters)    Stage 2 Mild CKD (GFR = 60-89 mL/min/1.73 square meters)    Stage 3A Moderate CKD (GFR = 45-59 mL/min/1.73 square meters)    Stage 3B Moderate CKD (GFR = 30-44 mL/min/1.73 square meters)    Stage 4 Severe CKD (GFR = 15-29 mL/min/1.73 square meters)    Stage 5 End Stage CKD (GFR <15 mL/min/1.73 square meters)  Note: GFR calculation is accurate only with a steady state creatinine    Lipase [[id number]]  (Normal) Collected: 04/29/25 1240    Lab Status: Final result Specimen: Blood from Arm, Right Updated: 04/29/25  1318     Lipase 15 u/L     Lactic acid, plasma (w/reflex if result > 2.0) [[id number]]  (Normal) Collected: 04/29/25 1240    Lab Status: Final result Specimen: Blood from Arm, Right Updated: 04/29/25 1317     LACTIC ACID 1.4 mmol/L     Narrative:      Result may be elevated if tourniquet was used during collection.    UA (URINE) with reflex to Scope [[id number]] Collected: 04/29/25 1241    Lab Status: Final result Specimen: Urine, Clean Catch Updated: 04/29/25 1253     Color, UA Yellow     Clarity, UA Clear     Specific Gravity, UA 1.025     pH, UA 6.0     Leukocytes, UA Negative     Nitrite, UA Negative     Protein, UA Negative mg/dl      Glucose, UA Negative mg/dl      Ketones, UA Negative mg/dl      Urobilinogen, UA 0.2 E.U./dl      Bilirubin, UA Negative     Occult Blood, UA Negative    CBC and differential [[id number]] Collected: 04/29/25 1240    Lab Status: Final result Specimen: Blood from Arm, Right Updated: 04/29/25 1249     WBC 7.28 Thousand/uL      RBC 4.66 Million/uL      Hemoglobin 12.9 g/dL      Hematocrit 39.7 %      MCV 85 fL      MCH 27.7 pg      MCHC 32.5 g/dL      RDW 12.2 %      MPV 8.9 fL      Platelets 296 Thousands/uL      nRBC 0 /100 WBCs      Segmented % 56 %      Immature Grans % 1 %      Lymphocytes % 29 %      Monocytes % 12 %      Eosinophils Relative 2 %      Basophils Relative 0 %      Absolute Neutrophils 4.12 Thousands/µL      Absolute Immature Grans 0.04 Thousand/uL      Absolute Lymphocytes 2.13 Thousands/µL      Absolute Monocytes 0.84 Thousand/µL      Eosinophils Absolute 0.12 Thousand/µL      Basophils Absolute 0.03 Thousands/µL     POCT pregnancy, urine [[id number]]  (Normal) Collected: 04/29/25 1241    Lab Status: Final result Updated: 04/29/25 1242     EXT Preg Test, Ur Negative     Control Valid            CT abdomen pelvis with contrast   Final Interpretation by Tyler Morrell MD (04/29 1414)      No acute findings in the abdomen or pelvis.         Workstation performed:  [id number]             Procedures    ED Medication and Procedure Management   Prior to Admission Medications   Prescriptions Last Dose Informant Patient Reported? Taking?   PARoxetine (PAXIL) 40 MG tablet   Yes No   Sig: Take 40 mg by mouth daily   norethindrone-ethinyl estradiol-iron (MICROGESTIN FE1.5/30) 1.5-30 MG-MCG tablet   Yes No   Sig: Take 1 tablet by mouth daily      Facility-Administered Medications: None     Discharge Medication List as of 4/29/2025  2:18 PM        START taking these medications    Details   dicyclomine (BENTYL) 20 mg tablet Take 1 tablet (20 mg total) by mouth 2 (two) times a day, Starting Tue 4/29/2025, Normal      ondansetron (ZOFRAN) 4 mg tablet Take 1 tablet (4 mg total) by mouth every 6 (six) hours, Starting Tue 4/29/2025, Normal           CONTINUE these medications which have NOT CHANGED    Details   norethindrone-ethinyl estradiol-iron (MICROGESTIN FE1.5/30) 1.5-30 MG-MCG tablet Take 1 tablet by mouth daily, Starting Tue 8/15/2023, Until Wed 8/14/2024, Historical Med      PARoxetine (PAXIL) 40 MG tablet Take 40 mg by mouth daily, Starting Fri 2/23/2024, Until Sat 2/22/2025, Historical Med           No discharge procedures on file.  ED SEPSIS DOCUMENTATION   Time reflects when diagnosis was documented in both MDM as applicable and the Disposition within this note       Time User Action Codes Description Comment    4/29/2025  2:16 PM Ирина Stewart Add [R10.9] Abdominal pain                  Ирина Stewart PA-C  04/29/25 5774

## 2025-04-29 NOTE — DISCHARGE INSTRUCTIONS
I recommend a bland diet.  Avoid fatty greasy and spicy foods.  Pain medication and nausea medication sent to your pharmacy.  Please follow-up with your family doctor and return with any new or worsening symptoms.  CT abdomen pelvis with contrast   Final Result      No acute findings in the abdomen or pelvis.         Workstation performed: [id number]

## 2025-08-03 ENCOUNTER — APPOINTMENT (EMERGENCY)
Dept: RADIOLOGY | Facility: HOSPITAL | Age: 20
End: 2025-08-03
Payer: COMMERCIAL

## 2025-08-03 ENCOUNTER — HOSPITAL ENCOUNTER (EMERGENCY)
Facility: HOSPITAL | Age: 20
Discharge: HOME | End: 2025-08-03
Attending: EMERGENCY MEDICINE | Admitting: EMERGENCY MEDICINE
Payer: COMMERCIAL

## 2025-08-03 ENCOUNTER — APPOINTMENT (EMERGENCY)
Dept: CT IMAGING | Facility: HOSPITAL | Age: 20
End: 2025-08-03
Payer: COMMERCIAL

## 2025-08-03 VITALS
RESPIRATION RATE: 18 BRPM | HEART RATE: 80 BPM | BODY MASS INDEX: 37.76 KG/M2 | OXYGEN SATURATION: 98 % | SYSTOLIC BLOOD PRESSURE: 120 MMHG | DIASTOLIC BLOOD PRESSURE: 74 MMHG | WEIGHT: 220 LBS | TEMPERATURE: 98 F

## 2025-08-03 DIAGNOSIS — S16.1XXA STRAIN OF NECK MUSCLE, INITIAL ENCOUNTER: ICD-10-CM
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
## 2025-08-03 LAB
EXT PREGNANCY TEST URINE: NEGATIVE
EXT. CONTROL: NORMAL

## 2025-08-03 PROCEDURE — 99284 EMERGENCY DEPT VISIT MOD MDM: CPT | Performed by: EMERGENCY MEDICINE

## 2025-08-03 PROCEDURE — 72125 CT NECK SPINE W/O DYE: CPT

## 2025-08-03 PROCEDURE — 70450 CT HEAD/BRAIN W/O DYE: CPT

## 2025-08-03 PROCEDURE — 81025 URINE PREGNANCY TEST: CPT | Performed by: EMERGENCY MEDICINE

## 2025-08-03 PROCEDURE — 73030 X-RAY EXAM OF SHOULDER: CPT

## 2025-08-03 PROCEDURE — 99284 EMERGENCY DEPT VISIT MOD MDM: CPT

## 2025-08-03 RX ORDER — METHOCARBAMOL 500 MG/1
500 TABLET, FILM COATED ORAL 2 TIMES DAILY
Qty: 20 TABLET | Refills: 0 | Status: SHIPPED | OUTPATIENT
Start: 2025-08-03

## 2025-08-03 RX ORDER — METHOCARBAMOL 500 MG/1
1500 TABLET, FILM COATED ORAL ONCE
Status: COMPLETED | OUTPATIENT
Start: 2025-08-03 | End: 2025-08-03

## 2025-08-03 RX ORDER — LIDOCAINE 50 MG/G
1 PATCH TOPICAL ONCE
Status: DISCONTINUED | OUTPATIENT
Start: 2025-08-03 | End: 2025-08-03 | Stop reason: HOSPADM

## 2025-08-03 RX ORDER — IBUPROFEN 800 MG/1
800 TABLET, FILM COATED ORAL 3 TIMES DAILY
Qty: 21 TABLET | Refills: 0 | Status: SHIPPED | OUTPATIENT
Start: 2025-08-03

## 2025-08-03 RX ORDER — IBUPROFEN 400 MG/1
800 TABLET, FILM COATED ORAL ONCE
Status: COMPLETED | OUTPATIENT
Start: 2025-08-03 | End: 2025-08-03

## 2025-08-03 RX ORDER — ACETAMINOPHEN 325 MG/1
650 TABLET ORAL EVERY 6 HOURS PRN
Qty: 30 TABLET | Refills: 0 | Status: SHIPPED | OUTPATIENT
Start: 2025-08-03

## 2025-08-03 RX ORDER — ACETAMINOPHEN 325 MG/1
975 TABLET ORAL ONCE
Status: COMPLETED | OUTPATIENT
Start: 2025-08-03 | End: 2025-08-03

## 2025-08-03 RX ADMIN — LIDOCAINE 1 PATCH: 50 PATCH TOPICAL at 15:35

## 2025-08-03 RX ADMIN — METHOCARBAMOL 1500 MG: 500 TABLET ORAL at 15:34

## 2025-08-03 RX ADMIN — IBUPROFEN 800 MG: 400 TABLET ORAL at 15:38

## 2025-08-03 RX ADMIN — ACETAMINOPHEN 975 MG: 325 TABLET ORAL at 15:37
